# Patient Record
Sex: MALE | Race: WHITE | NOT HISPANIC OR LATINO | Employment: OTHER | URBAN - METROPOLITAN AREA
[De-identification: names, ages, dates, MRNs, and addresses within clinical notes are randomized per-mention and may not be internally consistent; named-entity substitution may affect disease eponyms.]

---

## 2024-10-29 ENCOUNTER — OFFICE VISIT (OUTPATIENT)
Dept: ORTHOPEDIC SURGERY | Facility: CLINIC | Age: 54
End: 2024-10-29
Payer: OTHER GOVERNMENT

## 2024-10-29 VITALS
HEART RATE: 77 BPM | HEIGHT: 63 IN | WEIGHT: 265 LBS | SYSTOLIC BLOOD PRESSURE: 142 MMHG | BODY MASS INDEX: 46.95 KG/M2 | DIASTOLIC BLOOD PRESSURE: 94 MMHG | OXYGEN SATURATION: 95 %

## 2024-10-29 DIAGNOSIS — M19.019 OSTEOARTHRITIS OF AC (ACROMIOCLAVICULAR) JOINT: ICD-10-CM

## 2024-10-29 DIAGNOSIS — M25.512 LEFT SHOULDER PAIN, UNSPECIFIED CHRONICITY: Primary | ICD-10-CM

## 2024-10-29 DIAGNOSIS — S46.012A TRAUMATIC INCOMPLETE TEAR OF LEFT ROTATOR CUFF, INITIAL ENCOUNTER: ICD-10-CM

## 2024-10-29 RX ORDER — ATORVASTATIN CALCIUM 80 MG/1
80 TABLET, FILM COATED ORAL
COMMUNITY
Start: 2024-06-06

## 2024-10-29 RX ORDER — FENOFIBRATE 145 MG/1
145 TABLET, COATED ORAL
COMMUNITY
Start: 2024-06-06

## 2024-10-29 RX ORDER — METFORMIN HYDROCHLORIDE 500 MG/1
500 TABLET, EXTENDED RELEASE ORAL
COMMUNITY

## 2024-10-29 RX ORDER — VALSARTAN 320 MG/1
320 TABLET ORAL DAILY
COMMUNITY
Start: 2024-06-06

## 2024-10-29 NOTE — PROGRESS NOTES
"Chief Complaint  Pain and Initial Evaluation of the Left Shoulder (Mri on disk)       Subjective      Gerald Vargas presents to [facility] for an evaluation  of his left shoulder. He reports he injured his shoulder when he was in the  several years ago. He reports he was walking at night when he fell and landed on his left shoulder. He reports popping and the pain radiates up his neck. He has pain with external rotation. He recently went to his family doctor where he had x-rays and an MRI on his shoulder. He has been using topical creams. He has attended physical therapy without much relief. He is right hand dominant.     No Known Allergies     Social History     Socioeconomic History    Marital status:    Tobacco Use    Smoking status: Never     Passive exposure: Never    Smokeless tobacco: Never   Vaping Use    Vaping status: Never Used        I reviewed the patient's chief complaint, history of present illness, review of systems, past medical history, surgical history, family history, social history, medications, and allergy list.     Review of Systems     Constitutional: Denies fevers, chills, weight loss  Cardiovascular: Denies chest pain, shortness of breath  Skin: Denies rashes, acute skin changes  Neurologic: Denies headache, loss of consciousness  MSK: left shoulder pain       Vital Signs:   /94   Pulse 77   Ht 160 cm (63\")   Wt 120 kg (265 lb)   SpO2 95%   BMI 46.94 kg/m²            Ortho Exam    Physical Exam  General:Alert. No acute distress     Left upper extremity: mild prominence at the distal clavicle but non tender, forward elevation  to 170 degrees, abduction to 145 degrees, external rotation  to 75 degrees, internal rotation to 75 degrees, internal rotation to T12, negative  lift off, 5/5 infraspinatus and subscap, negative  impingement, negative  cross arm, negative [place], neurovascularly intact, sensation intact to the medial, radial " and ulnar nerve      Procedures    X-Ray Report:  Left scapula X-Ray  Indication: Evaluation of left shoulder pain   AP/Lateral view(s)  Findings: widening of the AC joint, deformity of distal clavicle consistent with previous lateral clavicle fracture healed, osteophytes to the inferior humeral head, mild degenerative changes, no acute fracture   Prior studies available for comparison: no       Imaging Results (Most Recent)       Procedure Component Value Units Date/Time    XR Scapula Left [[id number]] Resulted: 10/29/24 1323     Updated: 10/29/24 1328             Result Review :       No results found.           Assessment and Plan     Diagnoses and all orders for this visit:    1. Left shoulder pain, unspecified chronicity (Primary)  -     XR Scapula Left    2. Traumatic incomplete tear of left rotator cuff, initial encounter    3. Osteoarthritis of AC (acromioclavicular) joint  -     Case Request; Standing  -     Follow Anesthesia Guidelines / Protocol; Standing  -     Nerve Block; Standing  -     Verify NPO Status; Standing  -     Verify The Time Patient Completed ERAS Hydration Drink; Standing  -     SCD (Sequential Compression Device) Place on Patient in Pre-Op; Standing  -     POC Glucose Once; Standing  -     Clip Operative Site; Standing  -     ceFAZolin (ANCEF) 2 g in sodium chloride 0.9 % 100 mL IVPB  -     ceFAZolin (ANCEF) 3 g in sodium chloride 0.9 % 100 mL IVPB  -     Case Request        The patient presents here today for an evaluation  of his left shoulder. MRI results were discussed with the patient today in the office the was obtained by his PCP.     Discussed operative treatment options regarding a left shoulder arthroscopy with rotator cuff debridement versus repair, subacromial decompression, distal clavicle resection, chondroplasty versus non operative treatment options regarding injections, medications and therapy.     Patient wishes to proceed with operative treatment options. Risks and  benefits were discussed and reviewed with the patient today in the office. Patient expressed understanding and wishes to proceed.     Educated on risk of elevated BMI.  Discussed options for weight loss/decreasing BMI prior to procedure including dietician consult, weight loss options and exercise program., Discussed surgery., Risks/benefits discussed with patient including, but not limited to: infection, bleeding, neurovascular damage, re-rupture, aesthetic deformity, need for further surgery, and death., Surgery pamphlet given., Call or return if worsening symptoms., and I am ordering the use of the Nice1 cold therapy machine for 60 days post-op as part of an opioid-sparing approach to help manage pain and edema.  I feel this is medically necessary for the best care for this patient.       Follow Up     2 weeks post-operatively        Patient was given instructions and counseling regarding his condition or for health maintenance advice. Please see specific information pulled into the AVS if appropriate.     Scribed for Alphonse Villafuerte MD by Amy Alcazar.  10/29/24   13:09 EDT    I have personally performed the services described in this document as scribed by the above individual and it is both accurate and complete. Alphonse Villafuerte MD 10/30/24